# Patient Record
Sex: MALE | URBAN - METROPOLITAN AREA
[De-identification: names, ages, dates, MRNs, and addresses within clinical notes are randomized per-mention and may not be internally consistent; named-entity substitution may affect disease eponyms.]

---

## 2023-04-29 ENCOUNTER — APPOINTMENT (RX ONLY)
Dept: URBAN - METROPOLITAN AREA CLINIC 41 | Facility: CLINIC | Age: 58
Setting detail: DERMATOLOGY
End: 2023-04-29

## 2023-04-29 DIAGNOSIS — B07.8 OTHER VIRAL WARTS: ICD-10-CM | Status: INADEQUATELY CONTROLLED

## 2023-04-29 PROCEDURE — ? LIQUID NITROGEN

## 2023-04-29 PROCEDURE — ? ADDITIONAL NOTES

## 2023-04-29 PROCEDURE — 17110 DESTRUCTION B9 LES UP TO 14: CPT

## 2023-04-29 PROCEDURE — ? COUNSELING

## 2023-04-29 ASSESSMENT — LOCATION DETAILED DESCRIPTION DERM: LOCATION DETAILED: RIGHT MEDIAL SUPERIOR EYELID

## 2023-04-29 ASSESSMENT — LOCATION ZONE DERM: LOCATION ZONE: EYELID

## 2023-04-29 ASSESSMENT — LOCATION SIMPLE DESCRIPTION DERM: LOCATION SIMPLE: RIGHT SUPERIOR EYELID

## 2023-04-29 NOTE — HPI: SKIN LESION (IRRITATED SKIN TAGS)
Is This A New Presentation, Or A Follow-Up?: Irritated Skin Lesion
Additional History: New pt here. Pt presents with concerns of an irritated skin tag on his right eyelid. Pt notes lesion has been present for 1 year and is itchy.

## 2023-04-29 NOTE — PROCEDURE: COUNSELING
Patient Specific Counseling (Will Not Stick From Patient To Patient): -\\nDK counsels that pt lesion on eyelid is not a skin tag but rather a collection of warty growths. DK recommends treating area today with LN2 and counsels that it may require more than 1 tx to reduce fully. DK counsels that tx is not required for this lesion but can be necessary to achieve pt cosmetic results.
Detail Level: Zone